# Patient Record
Sex: MALE | Race: WHITE | URBAN - METROPOLITAN AREA
[De-identification: names, ages, dates, MRNs, and addresses within clinical notes are randomized per-mention and may not be internally consistent; named-entity substitution may affect disease eponyms.]

---

## 2017-07-28 ENCOUNTER — OFFICE VISIT (OUTPATIENT)
Dept: FAMILY MEDICINE | Facility: OTHER | Age: 22
End: 2017-07-28
Payer: COMMERCIAL

## 2017-07-28 VITALS
TEMPERATURE: 98.2 F | DIASTOLIC BLOOD PRESSURE: 58 MMHG | RESPIRATION RATE: 16 BRPM | SYSTOLIC BLOOD PRESSURE: 104 MMHG | BODY MASS INDEX: 29.06 KG/M2 | HEIGHT: 69 IN | WEIGHT: 196.2 LBS | HEART RATE: 80 BPM

## 2017-07-28 DIAGNOSIS — L08.9 INFECTED LESION OF SKIN: Primary | ICD-10-CM

## 2017-07-28 PROCEDURE — 99213 OFFICE O/P EST LOW 20 MIN: CPT | Performed by: NURSE PRACTITIONER

## 2017-07-28 RX ORDER — SULFAMETHOXAZOLE/TRIMETHOPRIM 800-160 MG
1 TABLET ORAL 2 TIMES DAILY
Qty: 20 TABLET | Refills: 0 | Status: SHIPPED | OUTPATIENT
Start: 2017-07-28 | End: 2017-08-30

## 2017-07-28 NOTE — NURSING NOTE
"Chief Complaint   Patient presents with     Infection       Initial /58 (BP Location: Left arm, Patient Position: Chair, Cuff Size: Adult Large)  Pulse 80  Temp 98.2  F (36.8  C) (Oral)  Resp 16  Ht 5' 9.49\" (1.765 m)  Wt 196 lb 3.2 oz (89 kg)  BMI 28.57 kg/m2 Estimated body mass index is 28.57 kg/(m^2) as calculated from the following:    Height as of this encounter: 5' 9.49\" (1.765 m).    Weight as of this encounter: 196 lb 3.2 oz (89 kg).  Medication Reconciliation: complete    "

## 2017-07-28 NOTE — PATIENT INSTRUCTIONS
Please wash lesion 2 times daily with soap and water. Keep clean cover during work. May soak in epsom salt/water to help extract puss. Take antibiotic with yogurt (3 servings per day) or probiotic.     Return to clinic if symptoms are worsening such as fever, increased redness and inflammation may indicate blood infection as discussed go to emergency.     Thank you  Veronica Winchester CNP

## 2017-07-28 NOTE — MR AVS SNAPSHOT
"              After Visit Summary   7/28/2017    Chandrakant Stewart    MRN: [id number]           Patient Information     Date Of Birth          1995        Visit Information        Provider Department      7/28/2017 4:40 PM Veronica Winchester APRN CNP [facility]        Today's Diagnoses     Infected lesion of skin    -  1      Care Instructions    Please wash lesion 2 times daily with soap and water. Keep clean cover during work. May soak in epsom salt/water to help extract puss. Take antibiotic with yogurt (3 servings per day) or probiotic.     Return to clinic if symptoms are worsening such as fever, increased redness and inflammation may indicate blood infection as discussed go to emergency.     Thank you  Veronica Winchester CNP            Follow-ups after your visit        Who to contact     If you have questions or need follow up information about today's clinic visit or your schedule please contact [facility] directly at [phone].  Normal or non-critical lab and imaging results will be communicated to you by MyChart, letter or phone within 4 business days after the clinic has received the results. If you do not hear from us within 7 days, please contact the clinic through [employer] or phone. If you have a critical or abnormal lab result, we will notify you by phone as soon as possible.  Submit refill requests through [employer] or call your pharmacy and they will forward the refill request to us. Please allow 3 business days for your refill to be completed.          Additional Information About Your Visit        [employer] Information     [employer] lets you send messages to your doctor, view your test results, renew your prescriptions, schedule appointments and more. To sign up, go to [email or web address] . Click on \"Log in\" on the left side of the screen, which will take you to the Welcome page. Then click on \"Sign up Now\" on the right side of the page.     You will be " "asked to enter the access code listed below, as well as some personal information. Please follow the directions to create your username and password.     Your access code is: [email or web address]  Expires: 10/26/2017  5:24 PM     Your access code will  in 90 days. If you need help or a new code, please call your [facility] clinic or [phone].        Care EveryWhere ID     This is your Care EveryWhere ID. This could be used by other organizations to access your [facility] medical records  [id number]        Your Vitals Were     Pulse Temperature Respirations Height BMI (Body Mass Index)       80 98.2  F (36.8  C) (Oral) 16 5' 9.49\" (1.765 m) 28.57 kg/m2        Blood Pressure from Last 3 Encounters:   17 104/58    Weight from Last 3 Encounters:   17 196 lb 3.2 oz (89 kg)              Today, you had the following     No orders found for display         Today's Medication Changes          These changes are accurate as of: 17  5:24 PM.  If you have any questions, ask your nurse or doctor.               Start taking these medicines.        Dose/Directions    sulfamethoxazole-trimethoprim 800-160 MG per tablet   Commonly known as:  BACTRIM DS/SEPTRA DS   Used for:  Infected lesion of skin   Started by:  Veronica Winchester APRN CNP        Dose:  1 tablet   Take 1 tablet by mouth 2 times daily   Quantity:  20 tablet   Refills:  0            Where to get your medicines      These medications were sent to [facility] - [place], [place] - [address]  [address] [address]    Hours:  [facility] [place] - numbers unchanged   03  Phone:  [phone]     sulfamethoxazole-trimethoprim 800-160 MG per tablet                Primary Care Provider Office Phone # Fax #    MINNIE Smith [phone]       [facility] [address]  [facility] [address]        Equal Access to Services     KEISHA DONOHUE AH: rayray Milian " rodolfo lamontneo martinezsanty amnaalisa mccall. So [facility] [phone].    ATENCIÓN: Si ana rosa lamb, tiene a figueredo disposición servicios gratuitos de asistencia lingüística. Llame al [phone].    We comply with applicable federal civil rights laws and [place] laws. We do not discriminate on the basis of race, color, national origin, age, disability sex, sexual orientation or gender identity.            Thank you!     Thank you for choosing [facility]  for your care. Our goal is always to provide you with excellent care. Hearing back from our patients is one way we can continue to improve our services. Please take a few minutes to complete the written survey that you may receive in the mail after your visit with us. Thank you!             Your Updated Medication List - Protect others around you: Learn how to safely use, store and throw away your medicines at www.disposemymeds.org.          This list is accurate as of: 7/28/17  5:24 PM.  Always use your most recent med list.                   Brand Name Dispense Instructions for use Diagnosis    sulfamethoxazole-trimethoprim 800-160 MG per tablet    BACTRIM DS/SEPTRA DS    20 tablet    Take 1 tablet by mouth 2 times daily    Infected lesion of skin

## 2017-07-28 NOTE — PROGRESS NOTES
"  SUBJECTIVE:                                                    Chandrakant Stewart is a 22 year old male who presents to clinic today for the following health issues:      HPI    Concern - infected mass in left forearm   Onset: couple weeks     Description:   Infected mass in the left forearm, he has squeezed stuff out of it many times but it isn't going away     Intensity: moderate    Progression of Symptoms:  worsening and same    Accompanying Signs & Symptoms:  -slight warmth to the touch  -oozing creamy liquid     Previous history of similar problem:   Had the same thing happen on the other are but that one went away     Problem list and histories reviewed & adjusted, as indicated.  Additional history: as documented        BP Readings from Last 3 Encounters:   07/28/17 104/58    Wt Readings from Last 3 Encounters:   07/28/17 196 lb 3.2 oz (89 kg)                  Labs reviewed in EPIC      ROS:  Constitutional, HEENT, cardiovascular, pulmonary, gi and gu systems are negative, except as otherwise noted.      OBJECTIVE:   /58 (BP Location: Left arm, Patient Position: Chair, Cuff Size: Adult Large)  Pulse 80  Temp 98.2  F (36.8  C) (Oral)  Resp 16  Ht 5' 9.49\" (1.765 m)  Wt 196 lb 3.2 oz (89 kg)  BMI 28.57 kg/m2  Body mass index is 28.57 kg/(m^2).  GENERAL: healthy, alert and no distress  NECK: no adenopathy, no asymmetry, masses, or scars and thyroid normal to palpation  RESP: lungs clear to auscultation - no rales, rhonchi or wheezes  CV: regular rate and rhythm, normal S1 S2, no S3 or S4, no murmur, click or rub, no peripheral edema and peripheral pulses strong  MS: no gross musculoskeletal defects noted, no edema  SKIN: lesion 3.5cm, erythematic, warm, tender, oozing purulent drainage.   Diagnostic Test Results:  none     ASSESSMENT/PLAN:       1. Infected lesion of skin  Concern for MRSA unable to collect culture due to time of day past 5 pm our lab closed.   - sulfamethoxazole-trimethoprim (BACTRIM " DS/SEPTRA DS) 800-160 MG per tablet; Take 1 tablet by mouth 2 times daily  Dispense: 20 tablet; Refill: 0    Patient Instructions   Please wash lesion 2 times daily with soap and water. Keep clean cover during work. May soak in epsom salt/water to help extract puss. Take antibiotic with yogurt (3 servings per day) or probiotic.     Return to clinic if symptoms are worsening such as fever, increased redness and inflammation may indicate blood infection as discussed go to emergency.     Thank you  Veronica Winchester CNP    Home care instructions were reviewed with the patient. The risks, benefits and treatment options of prescribed medications or other treatments have been discussed with the patient. The patient verbalized their understanding and should call or follow up if no improvement or if they develop further problems.      MINNIE [facility]

## 2017-08-30 ENCOUNTER — OFFICE VISIT (OUTPATIENT)
Dept: FAMILY MEDICINE | Facility: OTHER | Age: 22
End: 2017-08-30
Payer: COMMERCIAL

## 2017-08-30 VITALS
SYSTOLIC BLOOD PRESSURE: 122 MMHG | RESPIRATION RATE: 18 BRPM | HEART RATE: 78 BPM | WEIGHT: 202.6 LBS | TEMPERATURE: 98.5 F | BODY MASS INDEX: 29.5 KG/M2 | DIASTOLIC BLOOD PRESSURE: 68 MMHG

## 2017-08-30 DIAGNOSIS — L08.9 LOCAL INFECTION OF SKIN AND SUBCUTANEOUS TISSUE: Primary | ICD-10-CM

## 2017-08-30 PROCEDURE — 87205 SMEAR GRAM STAIN: CPT | Performed by: NURSE PRACTITIONER

## 2017-08-30 PROCEDURE — 87077 CULTURE AEROBIC IDENTIFY: CPT | Performed by: NURSE PRACTITIONER

## 2017-08-30 PROCEDURE — 87070 CULTURE OTHR SPECIMN AEROBIC: CPT | Performed by: NURSE PRACTITIONER

## 2017-08-30 PROCEDURE — 99213 OFFICE O/P EST LOW 20 MIN: CPT | Performed by: NURSE PRACTITIONER

## 2017-08-30 PROCEDURE — 87186 SC STD MICRODIL/AGAR DIL: CPT | Performed by: NURSE PRACTITIONER

## 2017-08-30 RX ORDER — SULFAMETHOXAZOLE/TRIMETHOPRIM 800-160 MG
1 TABLET ORAL 2 TIMES DAILY
Qty: 20 TABLET | Refills: 0 | Status: SHIPPED | DISCHARGE
Start: 2017-08-30 | End: 2018-12-06

## 2017-08-30 RX ORDER — SULFAMETHOXAZOLE/TRIMETHOPRIM 800-160 MG
1 TABLET ORAL 2 TIMES DAILY
Qty: 20 TABLET | Refills: 0 | Status: SHIPPED | OUTPATIENT
Start: 2017-08-30 | End: 2017-08-30

## 2017-08-30 NOTE — MR AVS SNAPSHOT
"              After Visit Summary   8/30/2017    Chandrakant Stewart    MRN: [id number]           Patient Information     Date Of Birth          1995        Visit Information        Provider Department      8/30/2017 3:20 PM Veronica Winchester APRN CNP [facility]        Today's Diagnoses     Local infection of skin and subcutaneous tissue    -  1      Care Instructions    I will call you with results of wound culture and any additional treatment if needed.   Please take a probiotic or eat yogurt with antibiotic.    Thank you  Veronica Winchester CNP            Follow-ups after your visit        Who to contact     If you have questions or need follow up information about today's clinic visit or your schedule please contact [facility] directly at [phone].  Normal or non-critical lab and imaging results will be communicated to you by MyChart, letter or phone within 4 business days after the clinic has received the results. If you do not hear from us within 7 days, please contact the clinic through MyChart or phone. If you have a critical or abnormal lab result, we will notify you by phone as soon as possible.  Submit refill requests through [employer] or call your pharmacy and they will forward the refill request to us. Please allow 3 business days for your refill to be completed.          Additional Information About Your Visit        [employer] Information     [employer] lets you send messages to your doctor, view your test results, renew your prescriptions, schedule appointments and more. To sign up, go to [email or web address] . Click on \"Log in\" on the left side of the screen, which will take you to the Welcome page. Then click on \"Sign up Now\" on the right side of the page.     You will be asked to enter the access code listed below, as well as some personal information. Please follow the directions to create your username and password.     Your access code is: " [email or web address]  Expires: 10/26/2017  5:24 PM     Your access code will  in 90 days. If you need help or a new code, please call your [facility] clinic or [phone].        Care EveryWhere ID     This is your Care EveryWhere ID. This could be used by other organizations to access your [facility] medical records  [id number]        Your Vitals Were     Pulse Temperature Respirations BMI (Body Mass Index)          78 98.5  F (36.9  C) (Oral) 18 29.5 kg/m2         Blood Pressure from Last 3 Encounters:   17 122/68   17 104/58    Weight from Last 3 Encounters:   17 202 lb 9.6 oz (91.9 kg)   17 196 lb 3.2 oz (89 kg)              We Performed the Following     Gram stain     Wound Culture Aerobic Bacterial          Today's Medication Changes          These changes are accurate as of: 17  4:29 PM.  If you have any questions, ask your nurse or doctor.               Start taking these medicines.        Dose/Directions    amoxicillin-clavulanate 875-125 MG per tablet   Commonly known as:  AUGMENTIN   Used for:  Local infection of skin and subcutaneous tissue   Started by:  Veronica Winchester APRN CNP        Dose:  1 tablet   Take 1 tablet by mouth 2 times daily for 10 days   Quantity:  20 tablet   Refills:  0       sulfamethoxazole-trimethoprim 800-160 MG per tablet   Commonly known as:  BACTRIM DS/SEPTRA DS   Used for:  Local infection of skin and subcutaneous tissue   Started by:  Veronica Winchester APRN CNP        Dose:  1 tablet   Take 1 tablet by mouth 2 times daily   Quantity:  20 tablet   Refills:  0            Where to get your medicines      These medications were sent to [id number] - [place], [place] - [address]  [address] [address]    Hours:  [facility] [place] - jojo unchanged   03  Phone:  [phone]     amoxicillin-clavulanate 875-125 MG per tablet         Information about where to get these medications is not yet available     ! Ask your  nurse or doctor about these medications     sulfamethoxazole-trimethoprim 800-160 MG per tablet                Primary Care Provider Office Phone # Fax #    MINNIE Smith [phone]       [address] [place] [address]        Equal Access to Services     KEISHA DONOHUE : Hadii aad ku hadasho Soomaali, waaxda luqadaha, qaybta kaalmada adeegyada, waxay marvel haybrad khancuongtoby lea. So [facility] [phone].    ATENCIÓN: Si habla español, tiene a figueredo disposición servicios gratuitos de asistencia lingüística. Melva al [phone].    We comply with applicable federal civil rights laws and [place] laws. We do not discriminate on the basis of race, color, national origin, age, disability sex, sexual orientation or gender identity.            Thank you!     Thank you for choosing [facility]  for your care. Our goal is always to provide you with excellent care. Hearing back from our patients is one way we can continue to improve our services. Please take a few minutes to complete the written survey that you may receive in the mail after your visit with us. Thank you!             Your Updated Medication List - Protect others around you: Learn how to safely use, store and throw away your medicines at www.disposemymeds.org.          This list is accurate as of: 8/30/17  4:29 PM.  Always use your most recent med list.                   Brand Name Dispense Instructions for use Diagnosis    amoxicillin-clavulanate 875-125 MG per tablet    AUGMENTIN    20 tablet    Take 1 tablet by mouth 2 times daily for 10 days    Local infection of skin and subcutaneous tissue       sulfamethoxazole-trimethoprim 800-160 MG per tablet    BACTRIM DS/SEPTRA DS    20 tablet    Take 1 tablet by mouth 2 times daily    Local infection of skin and subcutaneous tissue

## 2017-08-30 NOTE — NURSING NOTE
"No chief complaint on file.      Initial /68 (BP Location: Right arm, Patient Position: Chair, Cuff Size: Adult Regular)  Pulse 78  Temp 98.5  F (36.9  C) (Oral)  Resp 18  Wt 202 lb 9.6 oz (91.9 kg)  BMI 29.5 kg/m2 Estimated body mass index is 29.5 kg/(m^2) as calculated from the following:    Height as of 7/28/17: 5' 9.49\" (1.765 m).    Weight as of this encounter: 202 lb 9.6 oz (91.9 kg).  Medication Reconciliation: complete    "

## 2017-08-30 NOTE — PROGRESS NOTES
SUBJECTIVE:                                                    Chandrakant Stewart is a 22 year old male who presents to clinic today for the following health issues:      HPI    Concern - Recheck on staph infection (isn't clearing up) Left forearm   Onset: about a month and a half     Description:   Was seen with WS on 7/28/17, started on Bactrim. Wasn't clearing up to much so pt was seen at [facility] urgent care on 8/6/17 where they added Augmentin to the Bactrim regimen. This worked well but once the medication ended the symptoms stopped improving and are now starting to return     Intensity: moderate    Progression of Symptoms:  worsening  Completed dose of augmentin last week. Area on left forearm has increasingly worsened with inflammation, oozing puss, warm and tender to touch.   Unable to obtain culture last ov so this will be done today.     Problem list and histories reviewed & adjusted, as indicated.  Additional history: as documented    BP Readings from Last 3 Encounters:   08/30/17 122/68   07/28/17 104/58    Wt Readings from Last 3 Encounters:   08/30/17 202 lb 9.6 oz (91.9 kg)   07/28/17 196 lb 3.2 oz (89 kg)        Labs reviewed in EPIC      ROS:  Constitutional, HEENT, cardiovascular, pulmonary, gi and gu systems are negative, except as otherwise noted.      OBJECTIVE:   /68 (BP Location: Right arm, Patient Position: Chair, Cuff Size: Adult Regular)  Pulse 78  Temp 98.5  F (36.9  C) (Oral)  Resp 18  Wt 202 lb 9.6 oz (91.9 kg)  BMI 29.5 kg/m2  Body mass index is 29.5 kg/(m^2).  GENERAL: healthy, alert and no distress  NECK: no adenopathy, no asymmetry, masses, or scars and thyroid normal to palpation  RESP: lungs clear to auscultation - no rales, rhonchi or wheezes  CV: regular rate and rhythm, normal S1 S2, no S3 or S4, no murmur, click or rub, no peripheral edema and peripheral pulses strong  MS: no gross musculoskeletal defects noted, no edema  SKIN: area on left forearm approximately 2  inches in diameter, oozing purulent drainage, warm, tender to touch, slight inflammation, tender to touch.    NEURO: Normal strength and tone, mentation intact and speech normal    Diagnostic Test Results:  none     ASSESSMENT/PLAN:     1. Local infection of skin and subcutaneous tissue  Culture sent to lab. Will continue additional 10 days of antibiotics as these were successfully treating the infection.   Discussed if area became more swollen, red, tender, growing in size, fever and malaise he will go to ED or return to clinic for further evaluation as this may indicate septic infection.   - amoxicillin-clavulanate (AUGMENTIN) 875-125 MG per tablet; Take 1 tablet by mouth 2 times daily for 10 days  Dispense: 20 tablet; Refill: 0  - sulfamethoxazole-trimethoprim (BACTRIM DS/SEPTRA DS) 800-160 MG per tablet; Take 1 tablet by mouth 2 times daily  Dispense: 20 tablet; Refill: 0  - Wound Culture Aerobic Bacterial  - Gram stain    Home care instructions were reviewed with the patient. The risks, benefits and treatment options of prescribed medications or other treatments have been discussed with the patient. The patient verbalized their understanding and should call or follow up if no improvement or if they develop further problems.  Return to clinic prn      Patient Instructions   I will call you with results of wound culture and any additional treatment if needed.   Please take a probiotic or eat yogurt with antibiotic.    Thank you  Veronica Winchester, MINNIE [facility]

## 2017-08-30 NOTE — PATIENT INSTRUCTIONS
I will call you with results of wound culture and any additional treatment if needed.   Please take a probiotic or eat yogurt with antibiotic.    Thank you  Veronica Winchester CNP

## 2017-08-31 LAB
GRAM STN SPEC: ABNORMAL
GRAM STN SPEC: ABNORMAL
SPECIMEN SOURCE: ABNORMAL

## 2017-09-02 LAB
BACTERIA SPEC CULT: ABNORMAL
SPECIMEN SOURCE: ABNORMAL

## 2017-09-05 ENCOUNTER — TELEPHONE (OUTPATIENT)
Dept: LAB | Facility: OTHER | Age: 22
End: 2017-09-05

## 2017-09-05 NOTE — TELEPHONE ENCOUNTER
----- Message from MINNIE Smith CNP sent at 9/5/2017 10:40 AM CDT -----  Please let Chandrakant know that his culture did not grow the MRSA we discussed he should improve with the antibiotics he is on. If he continues to have worsening symptoms please return to clinic as discussed for further evaluation    Thank you  Veronica Winchester CNP

## 2017-09-05 NOTE — TELEPHONE ENCOUNTER
Spoke to patient message below given no further questions.  Meeta Hou CMA ([facility])

## 2018-01-21 ENCOUNTER — HEALTH MAINTENANCE LETTER (OUTPATIENT)
Age: 23
End: 2018-01-21

## 2018-12-06 ENCOUNTER — OFFICE VISIT (OUTPATIENT)
Dept: FAMILY MEDICINE | Facility: CLINIC | Age: 23
End: 2018-12-06
Payer: COMMERCIAL

## 2018-12-06 VITALS
BODY MASS INDEX: 27.2 KG/M2 | TEMPERATURE: 99.1 F | DIASTOLIC BLOOD PRESSURE: 72 MMHG | SYSTOLIC BLOOD PRESSURE: 104 MMHG | RESPIRATION RATE: 18 BRPM | HEIGHT: 71 IN | WEIGHT: 194.3 LBS | HEART RATE: 72 BPM | OXYGEN SATURATION: 99 %

## 2018-12-06 DIAGNOSIS — L98.9 SKIN LESION: Primary | ICD-10-CM

## 2018-12-06 PROCEDURE — 99213 OFFICE O/P EST LOW 20 MIN: CPT | Performed by: PHYSICIAN ASSISTANT

## 2018-12-06 ASSESSMENT — PAIN SCALES - GENERAL: PAINLEVEL: NO PAIN (0)

## 2018-12-06 NOTE — PROGRESS NOTES
"  SUBJECTIVE:   Chandrakant Stewart is a 23 year old male who presents to clinic today for the following health issues:  \    HPI  Concern - Spots on skin  Onset: \"entire life\"    Description:   Spots all over body, random places    Intensity: mild, moderate    Progression of Symptoms:  same    Accompanying Signs & Symptoms:  No pain, no burning, no itching. Will scab over and go away \"takes a long time\" and they come back    Previous history of similar problem:   Yes    Precipitating factors:   Worsened by: None    Alleviating factors:  Improved by: None    Therapies Tried and outcome: Has tried antibiotics in the past. Wondering about a blood infection.   Problem list and histories reviewed & adjusted, as indicated.  Additional history: as documented      ROS:  Constitutional, HEENT, cardiovascular, pulmonary, gi and gu systems are negative, except as otherwise noted.    OBJECTIVE:     /72  Pulse 72  Temp 99.1  F (37.3  C) (Temporal)  Resp 18  Ht 5' 11\" (1.803 m)  Wt 194 lb 4.8 oz (88.1 kg)  SpO2 99%  BMI 27.1 kg/m2  Body mass index is 27.1 kg/(m^2).  GENERAL: healthy, alert and no distress  SKIN: 0.5 cm lesion anterior upper arm over bicep erythematous, blanching with collarette scale.  No excoriation or drainage noted.    Diagnostic Test Results:  none     ASSESSMENT/PLAN:     1. Skin lesion  - DERMATOLOGY REFERRAL    Follow up per dermatology  Patient amenable to this follow up plan.     Vale Stewart PA-C  [facility]  "

## 2018-12-06 NOTE — MR AVS SNAPSHOT
After Visit Summary   12/6/2018    Chandrakant Stewart    MRN: [id number]           Patient Information     Date Of Birth          1995        Visit Information        Provider Department      12/6/2018 11:20 AM Vale Stewart PA-C [facility]        Today's Diagnoses     Skin lesion    -  1       Follow-ups after your visit        Additional Services     DERMATOLOGY REFERRAL       Your provider has referred you to: [facility]: [facility] [phone]   [email or web address]    Please be aware that coverage of these services is subject to the terms and limitations of your health insurance plan.  Call member services at your health plan with any benefit or coverage questions.      Please bring the following with you to your appointment:    (1) Any X-Rays, CTs or MRIs which have been performed.  Contact the facility where they were done to arrange for  prior to your scheduled appointment.    (2) List of current medications  (3) This referral request   (4) Any documents/labs given to you for this referral                  Who to contact     If you have questions or need follow up information about today's clinic visit or your schedule please contact [facility] directly at [phone].  Normal or non-critical lab and imaging results will be communicated to you by MyChart, letter or phone within 4 business days after the clinic has received the results. If you do not hear from us within 7 days, please contact the clinic through MyChart or phone. If you have a critical or abnormal lab result, we will notify you by phone as soon as possible.  Submit refill requests through [employer] or call your pharmacy and they will forward the refill request to us. Please allow 3 business days for your refill to be completed.          Additional Information About Your Visit        MyChart Information      "[employer]i lets you send messages to your doctor, view your test results, renew your prescriptions, schedule appointments and more. To sign up, go to [email or web address] . Click on \"Log in\" on the left side of the screen, which will take you to the Welcome page. Then click on \"Sign up Now\" on the right side of the page.     You will be asked to enter the access code listed below, as well as some personal information. Please follow the directions to create your username and password.     Your access code is: [email or web address]  Expires: 3/6/2019 11:57 AM     Your access code will  in 90 days. If you need help or a new code, please call your [place] clinic or [phone].        Care EveryWhere ID     This is your Care EveryWhere ID. This could be used by other organizations to access your [place] medical records  [id number]        Your Vitals Were     Pulse Temperature Respirations Height Pulse Oximetry BMI (Body Mass Index)    72 99.1  F (37.3  C) (Temporal) 18 5' 11\" (1.803 m) 99% 27.1 kg/m2       Blood Pressure from Last 3 Encounters:   18 104/72   17 122/68   17 104/58    Weight from Last 3 Encounters:   18 194 lb 4.8 oz (88.1 kg)   17 202 lb 9.6 oz (91.9 kg)   17 196 lb 3.2 oz (89 kg)              We Performed the Following     DERMATOLOGY REFERRAL        Primary Care Provider Office Phone # Fax #    Veronica Chely Winchester, APRN [phone]       [address] [place] [address]        Equal Access to Services     [facility] CHARANJIT : Hiram Teague, rayray reynolds, matthias kaalmada scotty, alisa lea. So [facility] [phone].    ATENCIÓN: Si habla español, tiene a figueredo disposición servicios gratuitos de asistencia lingüística. Llame al [phone].    We comply with applicable federal civil rights laws and [place] laws. We do not discriminate on the basis of race, color, national origin, age, disability, " sex, sexual orientation, or gender identity.            Thank you!     Thank you for choosing [facility]  for your care. Our goal is always to provide you with excellent care. Hearing back from our patients is one way we can continue to improve our services. Please take a few minutes to complete the written survey that you may receive in the mail after your visit with us. Thank you!             Your Updated Medication List - Protect others around you: Learn how to safely use, store and throw away your medicines at www.disposemymeds.org.      Notice  As of 12/6/2018 11:57 AM    You have not been prescribed any medications.       does not eat meat